# Patient Record
Sex: FEMALE | Race: BLACK OR AFRICAN AMERICAN | URBAN - METROPOLITAN AREA
[De-identification: names, ages, dates, MRNs, and addresses within clinical notes are randomized per-mention and may not be internally consistent; named-entity substitution may affect disease eponyms.]

---

## 2020-03-10 ENCOUNTER — OFFICE VISIT (OUTPATIENT)
Dept: SLEEP MEDICINE | Age: 45
End: 2020-03-10

## 2020-03-10 VITALS
OXYGEN SATURATION: 98 % | DIASTOLIC BLOOD PRESSURE: 80 MMHG | BODY MASS INDEX: 23.55 KG/M2 | HEART RATE: 70 BPM | WEIGHT: 159 LBS | TEMPERATURE: 97 F | RESPIRATION RATE: 20 BRPM | HEIGHT: 69 IN | SYSTOLIC BLOOD PRESSURE: 130 MMHG

## 2020-03-10 DIAGNOSIS — G47.10 HYPERSOMNIA, UNSPECIFIED: Primary | ICD-10-CM

## 2020-03-10 RX ORDER — DULOXETIN HYDROCHLORIDE 30 MG/1
30 CAPSULE, DELAYED RELEASE ORAL DAILY
COMMUNITY

## 2020-03-10 RX ORDER — NORETHINDRONE ACETATE AND ETHINYL ESTRADIOL 1MG-20(21)
KIT ORAL
COMMUNITY

## 2020-03-10 RX ORDER — CHOLECALCIFEROL (VITAMIN D3) 125 MCG
CAPSULE ORAL
COMMUNITY

## 2020-03-10 RX ORDER — LANOLIN ALCOHOL/MO/W.PET/CERES
CREAM (GRAM) TOPICAL
COMMUNITY

## 2020-03-10 NOTE — PROGRESS NOTES
[address] [facility] [place]. [place], [address]  Tel.  [phone]  Fax. 100 [facility] 60  [place], [address] [facility]  Tel.  [phone]  Fax. [phone] [address]  Tel.  [phone]  Fax. [phone]         Subjective:      Oleksandr Carrasco is an 53 y.o. female referred by Dr. Kimberli Saavedra, for evaluation for a sleep disorder. She complains of excessive daytime sleepiness associated with snoring. Symptoms began 4 years ago, gradually worsening since that time. She usually can fall asleep in a few minutes. Family or house members note snoring. She denies falling asleep while at work, driving. Oleksandr Carrasco does not wake up frequently at night. She is not bothered by waking up too early and left unable to get back to sleep. She actually sleeps about 9 hours at night and wakes up about 3 times during the night. She does not work shifts: [place] She sleeps 10 hours on weekdays and 12 hours plus naps on weekends. Orlando Gutierrez indicates she does not get too little sleep at night. Her bedtime is 0900. She awakens at 0700. She does take naps. She takes 7 naps a week lasting 1, 3, 2, 4. She has the following observed behaviors: Loud snoring, Light snoring, Twitching of legs or feet, Kicking with legs;  . Other remarks:   symptoms of sleepiness started with diagnosis of MS. She had been tried on several medications and it was thought that the sleepiness may have been due to the medications. She has now been on the same treatment (Gilenya) for over 2 years. Prior to 2016, she denied excessive daytime sleepiness  She denies cataplexy, sleep paralysis or sleep related hallucinations. She is a mental health counselor  [place] Sleepiness Score: 18   which reflect severe daytime drowsiness.     No Known Allergies      Current Outpatient Medications:     fingolimod (GILENYA) 0.5 mg cap, Take  by mouth., Disp: , Rfl:     DULoxetine (CYMBALTA) 30 mg capsule, Take 30 mg by mouth daily. , Disp: , Rfl:     ferrous sulfate (IRON) 325 mg (65 mg iron) tablet, Take  by mouth Daily (before breakfast). , Disp: , Rfl:     vit B Cmplx 3-FA-Vit C-Biotin (NEPHRO NEHEMIAS RX) 1- mg-mg-mcg tablet, Take 1 Tab by mouth daily. , Disp: , Rfl:     norethindrone-ethinyl estradiol (LOESTRIN FE 1/20, 28-DAY,) 1 mg-20 mcg (21)/75 mg (7) tab, Take  by mouth., Disp: , Rfl:     cholecalciferol, vitamin D3, (VITAMIN D3) 50 mcg (2,000 unit) tab, Take  by mouth., Disp: , Rfl:      She  has a past medical history of MS (multiple sclerosis) ([facility] [address] She  has a past surgical history that includes hx gyn. She family history includes Anxiety in her mother; Diabetes in her mother; Hypertension in her father and mother; Stroke in her father. She  reports that she has never smoked. She has never used smokeless tobacco. She reports current alcohol use of about 1.0 standard drinks of alcohol per week. Review of Systems:  Constitutional:  No significant weight loss or weight gain. Eyes:  No blurred vision. CVS:  No significant chest pain  Pulm:  No significant shortness of breath  GI:  No significant nausea or vomiting  :  No significant nocturia  Musculoskeletal:  No significant joint pain at night  Skin:  No significant rashes  Neuro:  No significant dizziness   Psych: Anxiety controlled    Sleep Review of Systems: notable for no difficulty falling asleep; infrequent awakenings at night;  regular dreaming noted; no nightmares ; no early morning headaches; no memory problems; no concentration issues; no history of any automobile or occupational accidents due to daytime drowsiness.       Objective:     Visit Vitals  /80 (BP 1 Location: Left arm, BP Patient Position: Sitting)   Pulse 70   Temp 97 °F (36.1 °C) (Oral)   Resp 20   Ht 5' 9\" (1.753 m)   Wt 159 lb (72.1 kg)   SpO2 98%   BMI 23.48 kg/m²         General:   Not in acute distress   Eyes:  Anicteric sclerae, no obvious strabismus Nose:  No obvious nasal septum deviation    Oropharynx:   Class 2 oropharyngeal outlet,, narrow tonsilo-pharyngeal pilars   Tonsils:   tonsils are present and normal   Neck:   Neck circ. in \"inches\": 15; midline trachea   Chest/Lungs:  Equal lung expansion, clear on auscultation    CVS:  Normal rate, regular rhythm; no JVD   Skin:  Warm to touch; no obvious rashes   Neuro:  No focal deficits ; no obvious tremor    Psych:  Normal affect,  normal countenance;          Assessment:       ICD-10-CM ICD-9-CM    1. Hypersomnia, unspecified G47.10 780.54 POLYSOMNOGRAPHY 1 NIGHT      POLYSOMNOGRAPHY (MSLT)      10-PANEL URINE DRUG SCREEN         Plan:     * The patient currently has a Low Risk for having sleep apnea. STOP-BANG score 2.  * PSG  MSLT was ordered for initial evaluation. We will follow the American Academy of Sleep Medicine protocol regarding split-night procedures and offering a trial of Positive Airway Pressure (CPAP, BPAP, etc.)  * She was provided information on sleep apnea including coresponding risk factors and the importance of proper treatment. * Counseling was provided regarding proper sleep hygiene and safe driving. * Return for follow-up shortly after sleep study to go over results and to discuss treatment options if indicated. She will make an appointment with her neurologist to see if her current medical regimen is optimized. Thank you for allowing us to participate in your patient's medical care. We'll keep you updated on these investigations.   Electronically signed by    Hemres Baig MD  Diplomate in Sleep Medicine  [facility]

## 2020-03-10 NOTE — PATIENT INSTRUCTIONS
[address] [facility] [address] [place]. [place], [address]  Tel.  [phone]  Fax. [address] [facility] [address]  [place], [address]  Tel.  [phone]  Fax. [phone] [address]  Tel.  [phone]  Fax. [phone]       Narcolepsy: After Your Visit  Your Care Instructions  Everybody gets a little sleepy once in a while, during a long car ride or other times when you want to be alert. But some people cannot control their sleepiness. It is no fun to be in the middle of your workday or driving your car down the street and have an overwhelming desire to sleep. This condition is called narcolepsy. Doctors do not know what causes narcolepsy. Your doctor may ask you to keep a sleep diary for a couple of weeks. It will help you and your doctor decide on treatment. It often helps to take limited naps during the day. It also helps to create a good mood and place for nighttime sleep. Your doctor may recommend medicine to help you stay awake during the day or sleep at night. Follow-up care is a key part of your treatment and safety. Be sure to make and go to all appointments, and call your doctor if you are having problems. It's also a good idea to know your test results and keep a list of the medicines you take. How can you care for yourself at home? · Try to take 2 or 3 short naps at regular times during the day. After a nap, always give yourself time to become alert before you drive a car or do anything that might cause an accident. · Take your medicines exactly as prescribed. Call your doctor if you think you are having a problem with your medicine. You may need to try several medicines before you find the one that works best for you. · Try to improve your nighttime sleep habits. Here are a few of the things you could do:  ¨ Go to bed only when you are sleepy, and get up at the same time every day, even if you do not feel rested.  This might help you sleep well the next night and the night after that. ¨ If you lie awake for longer than 15 minutes, get up, leave the bedroom, and do something quiet, such as read, until you feel sleepy again. ¨ Avoid drinking or eating anything with caffeine after 3 p.m. This includes coffee, tea, cola drinks, and chocolate. ¨ Make sure your bedroom is not too hot or too cold, and keep it quiet and dark. ¨ Make sure your mattress provides good support. · Be kind to your body:  ¨ Relieve tension with exercise or a massage. ¨ Learn and do relaxation techniques. ¨ Avoid alcohol, caffeine, nicotine, and illegal drugs. They can increase your anxiety level and cause sleep problems. · Get light exercise daily. Gentle stretching, light aerobics, swimming, walking, and riding a bicycle can help to keep you going during the day and to sleep well at night. · Eat a healthy diet. You may feel better if you avoid heavy meals and eat more fruits and vegetables. · Do not use over-the-counter sleeping pills. They can make your sleep restless. · Ask your doctor if any medicines you take could cause sleepiness. For example, cold and allergy medicines can make you drowsy. · Consider joining a support group with people who have narcolepsy or other sleep problems. These groups can be a good source of tips for what to do. Also, it can be comforting to talk to people who face similar challenges. Your doctor can tell you how to contact a support group. When should you call for help? Call your doctor now or seek immediate medical care if:  · You passed out (lost consciousness). · You cannot use your muscles. This may happen very briefly, sometimes after you laugh or are angry, and may only affect part of your body. Watch closely for changes in your health, and be sure to contact your doctor if:  · Your sleepiness continues to get worse. Where can you learn more?    Go to DAXKO.be  Enter V069 in the search box to learn more about \"Narcolepsy: After Your Visit. \"   © 4460-4729 Healthwise, Incorporated. Care instructions adapted under license by [employer] (which disclaims liability or warranty for this information). This care instruction is for use with your licensed healthcare professional. If you have questions about a medical condition or this instruction, always ask your healthcare professional. [address] any warranty or liability for your use of this information. Content Version: 9.0.30308; Last Revised: September 15, 2009  PROPER SLEEP HYGIENE    What to avoid  · Do not have drinks with caffeine, such as coffee or black tea, for 8 hours before bed. · Do not smoke or use other types of tobacco near bedtime. Nicotine is a stimulant and can keep you awake. · Avoid drinking alcohol late in the evening, because it can cause you to wake in the middle of the night. · Do not eat a big meal close to bedtime. If you are hungry, eat a light snack. · Do not drink a lot of water close to bedtime, because the need to urinate may wake you up during the night. · Do not read or watch TV in bed. Use the bed only for sleeping and sexual activity. What to try  · Go to bed at the same time every night, and wake up at the same time every morning. Do not take naps during the day. · Keep your bedroom quiet, dark, and cool. · Get regular exercise, but not within 3 to 4 hours of your bedtime. .  · Sleep on a comfortable pillow and mattress. · If watching the clock makes you anxious, turn it facing away from you so you cannot see the time. · If you worry when you lie down, start a worry book. Well before bedtime, write down your worries, and then set the book and your concerns aside. · Try meditation or other relaxation techniques before you go to bed. · If you cannot fall asleep, get up and go to another room until you feel sleepy. Do something relaxing.  Repeat your bedtime routine before you go to bed again.  · Make your house quiet and calm about an hour before bedtime. Turn down the lights, turn off the TV, log off the computer, and turn down the volume on music. This can help you relax after a busy day. Drowsy Driving: The [facility] 54 cites drowsiness as a causing factor in more than 509,448 police reported crashes annually, resulting in 76,000 injuries and 1,500 deaths. Other surveys suggest 55% of people polled have driven while drowsy in the past year, 23% had fallen asleep but not crashed, 3% crashed, and 2% had and accident due to drowsy driving. Who is at risk? Young Drivers: One study of drowsy driving accidents states that 55% of the drivers were under 25 years. Of those, 75% were male. Shift Workers and Travelers: People who work overnight or travel across time zones frequently are at higher risk of experiencing Circadian Rhythm Disorders. They are trying to work and function when their body is programed to sleep. Sleep Deprived: Lack of sleep has a serious impact on your ability to pay attention or focus on a task. Consistently getting less than the average of 8 hours your body needs creates partial or cumulative sleep deprivation. Untreated Sleep Disorders: Sleep Apnea, Narcolepsy, R.L.S., and other sleep disorders (untreated) prevent a person from getting enough restful sleep. This leads to excessive daytime sleepiness and increases the risk for drowsy driving accidents by up to 7 times. Medications / Alcohol: Even over the counter medications can cause drowsiness. Medications that impair a drivers attention should have a warning label. Alcohol naturally makes you sleepy and on its own can cause accidents. Combined with excessive drowsiness its effects are amplified.    Signs of Drowsy Driving:   * You don't remember driving the last few miles   * You may drift out of your zoe   * You are unable to focus and your thoughts wander   * You may yawn more often than normal   * You have difficulty keeping your eyes open / nodding off   * Missing traffic signs, speeding, or tailgating  Prevention-   Good sleep hygiene, lifestyle and behavioral choices have the most impact on drowsy driving. There is no substitute for sleep and the average person requires 8 hours nightly. If you find yourself driving drowsy, stop and sleep. Consider the sleep hygiene tips provided during your visit as well. Medication Refill Policy: Refills for all medications require 1 week advance notice. Please have your pharmacy fax a refill request. We are unable to fax, or call in \"controled substance\" medications and you will need to pick these prescriptions up from our office. [employer] Activation    Thank you for requesting access to [employer]. Please follow the instructions below to securely access and download your online medical record. [employer] allows you to send messages to your doctor, view your test results, renew your prescriptions, schedule appointments, and more. How Do I Sign Up? 1. In your internet browser, go to [email or web address]. 2. Click on the First Time User? Click Here link in the Sign In box. You will see the New Member Sign Up page. 3. Enter your [employer]oft Access Code exactly as it appears below. You will not need to use this code after youve completed the sign-up process. If you do not sign up before the expiration date, you must request a new code. Xoft Access Code: [email or web address]  Expires: 2020  9:47 AM (This is the date your Xoft access code will )    4. Enter the last four digits of your Social Security Number (xxxx) and Date of Birth (mm/dd/yyyy) as indicated and click Submit. You will be taken to the next sign-up page. 5. Create a Xoft ID. This will be your Xoft login ID and cannot be changed, so think of one that is secure and easy to remember. 6. Create a Xoft password.  You can change your password at any time. 7. Enter your Password Reset Question and Answer. This can be used at a later time if you forget your password. 8. Enter your e-mail address. You will receive e-mail notification when new information is available in [address] 9. Click Sign Up. You can now view and download portions of your medical record. 10. Click the Download Summary menu link to download a portable copy of your medical information. Additional Information    If you have questions, please call [phone]. Remember, [employer] is NOT to be used for urgent needs. For medical emergencies, dial 911.

## 2020-06-19 ENCOUNTER — TELEPHONE (OUTPATIENT)
Dept: SLEEP MEDICINE | Age: 45
End: 2020-06-19

## 2020-06-19 NOTE — TELEPHONE ENCOUNTER
Called and left a message       Cancelled in-lab sleep study scheduled in June, due to COVID-19 Department Closure. Routing message to ordering provider for next steps.

## 2020-07-20 ENCOUNTER — TELEPHONE (OUTPATIENT)
Dept: SLEEP MEDICINE | Age: 45
End: 2020-07-20

## 2020-07-20 NOTE — LETTER
7/20/2020 9:34 AM    Ms. Jill Singletary 25      Dear Ms. Fuentes: We are now open at all three of our Sleep Labs. Please return our call to reschedule your sleep study. If you have any questions or concerns, we're happy to address these with you upon your return call. [phone].     Sincerely,      [address]